# Patient Record
Sex: FEMALE | Race: BLACK OR AFRICAN AMERICAN | NOT HISPANIC OR LATINO | Employment: OTHER | URBAN - METROPOLITAN AREA
[De-identification: names, ages, dates, MRNs, and addresses within clinical notes are randomized per-mention and may not be internally consistent; named-entity substitution may affect disease eponyms.]

---

## 2023-11-17 ENCOUNTER — HOSPITAL ENCOUNTER (EMERGENCY)
Facility: HOSPITAL | Age: 88
Discharge: HOME | End: 2023-11-17
Attending: EMERGENCY MEDICINE
Payer: MEDICARE

## 2023-11-17 VITALS
OXYGEN SATURATION: 97 % | DIASTOLIC BLOOD PRESSURE: 85 MMHG | HEIGHT: 64 IN | TEMPERATURE: 98.2 F | BODY MASS INDEX: 30.73 KG/M2 | HEART RATE: 82 BPM | WEIGHT: 180 LBS | SYSTOLIC BLOOD PRESSURE: 152 MMHG | RESPIRATION RATE: 16 BRPM

## 2023-11-17 DIAGNOSIS — R53.1 GENERALIZED WEAKNESS: ICD-10-CM

## 2023-11-17 DIAGNOSIS — N30.00 ACUTE CYSTITIS WITHOUT HEMATURIA: Primary | ICD-10-CM

## 2023-11-17 LAB
ALBUMIN SERPL BCP-MCNC: 4.4 G/DL (ref 3.4–5)
ALP SERPL-CCNC: 69 U/L (ref 33–136)
ALT SERPL W P-5'-P-CCNC: 6 U/L (ref 7–45)
ANION GAP SERPL CALC-SCNC: 13 MMOL/L (ref 10–20)
APPEARANCE UR: ABNORMAL
AST SERPL W P-5'-P-CCNC: 17 U/L (ref 9–39)
BACTERIA #/AREA URNS AUTO: ABNORMAL /HPF
BASOPHILS # BLD AUTO: 0.04 X10*3/UL (ref 0–0.1)
BASOPHILS NFR BLD AUTO: 0.3 %
BILIRUB SERPL-MCNC: 0.5 MG/DL (ref 0–1.2)
BILIRUB UR STRIP.AUTO-MCNC: NEGATIVE MG/DL
BUN SERPL-MCNC: 12 MG/DL (ref 6–23)
CALCIUM SERPL-MCNC: 11.3 MG/DL (ref 8.6–10.6)
CARDIAC TROPONIN I PNL SERPL HS: 21 NG/L (ref 0–34)
CHLORIDE SERPL-SCNC: 100 MMOL/L (ref 98–107)
CO2 SERPL-SCNC: 30 MMOL/L (ref 21–32)
COLOR UR: YELLOW
CREAT SERPL-MCNC: 0.81 MG/DL (ref 0.5–1.05)
EOSINOPHIL # BLD AUTO: 0.06 X10*3/UL (ref 0–0.4)
EOSINOPHIL NFR BLD AUTO: 0.5 %
ERYTHROCYTE [DISTWIDTH] IN BLOOD BY AUTOMATED COUNT: 17.5 % (ref 11.5–14.5)
GFR SERPL CREATININE-BSD FRML MDRD: 69 ML/MIN/1.73M*2
GLUCOSE BLD MANUAL STRIP-MCNC: 94 MG/DL (ref 74–99)
GLUCOSE SERPL-MCNC: 87 MG/DL (ref 74–99)
GLUCOSE UR STRIP.AUTO-MCNC: NEGATIVE MG/DL
HCT VFR BLD AUTO: 42.7 % (ref 36–46)
HGB BLD-MCNC: 13 G/DL (ref 12–16)
HOLD SPECIMEN: NORMAL
HOLD SPECIMEN: NORMAL
IMM GRANULOCYTES # BLD AUTO: 0.06 X10*3/UL (ref 0–0.5)
IMM GRANULOCYTES NFR BLD AUTO: 0.5 % (ref 0–0.9)
KETONES UR STRIP.AUTO-MCNC: ABNORMAL MG/DL
LEUKOCYTE ESTERASE UR QL STRIP.AUTO: ABNORMAL
LYMPHOCYTES # BLD AUTO: 2.66 X10*3/UL (ref 0.8–3)
LYMPHOCYTES NFR BLD AUTO: 21.1 %
MAGNESIUM SERPL-MCNC: 1.95 MG/DL (ref 1.6–2.4)
MCH RBC QN AUTO: 21.8 PG (ref 26–34)
MCHC RBC AUTO-ENTMCNC: 30.4 G/DL (ref 32–36)
MCV RBC AUTO: 72 FL (ref 80–100)
MONOCYTES # BLD AUTO: 1 X10*3/UL (ref 0.05–0.8)
MONOCYTES NFR BLD AUTO: 7.9 %
NEUTROPHILS # BLD AUTO: 8.81 X10*3/UL (ref 1.6–5.5)
NEUTROPHILS NFR BLD AUTO: 69.7 %
NITRITE UR QL STRIP.AUTO: NEGATIVE
NRBC BLD-RTO: 0 /100 WBCS (ref 0–0)
PH UR STRIP.AUTO: 6 [PH]
PHOSPHATE SERPL-MCNC: 2.5 MG/DL (ref 2.5–4.9)
PLATELET # BLD AUTO: 279 X10*3/UL (ref 150–450)
POTASSIUM SERPL-SCNC: 3.8 MMOL/L (ref 3.5–5.3)
PROT SERPL-MCNC: 7.8 G/DL (ref 6.4–8.2)
PROT UR STRIP.AUTO-MCNC: ABNORMAL MG/DL
RBC # BLD AUTO: 5.96 X10*6/UL (ref 4–5.2)
RBC # UR STRIP.AUTO: ABNORMAL /UL
RBC #/AREA URNS AUTO: ABNORMAL /HPF
SARS-COV-2 RNA RESP QL NAA+PROBE: NOT DETECTED
SODIUM SERPL-SCNC: 139 MMOL/L (ref 136–145)
SP GR UR STRIP.AUTO: 1.01
SQUAMOUS #/AREA URNS AUTO: ABNORMAL /HPF
UROBILINOGEN UR STRIP.AUTO-MCNC: <2 MG/DL
WBC # BLD AUTO: 12.6 X10*3/UL (ref 4.4–11.3)
WBC #/AREA URNS AUTO: ABNORMAL /HPF

## 2023-11-17 PROCEDURE — 36415 COLL VENOUS BLD VENIPUNCTURE: CPT | Performed by: STUDENT IN AN ORGANIZED HEALTH CARE EDUCATION/TRAINING PROGRAM

## 2023-11-17 PROCEDURE — 99283 EMERGENCY DEPT VISIT LOW MDM: CPT

## 2023-11-17 PROCEDURE — 87635 SARS-COV-2 COVID-19 AMP PRB: CPT | Performed by: STUDENT IN AN ORGANIZED HEALTH CARE EDUCATION/TRAINING PROGRAM

## 2023-11-17 PROCEDURE — 99285 EMERGENCY DEPT VISIT HI MDM: CPT | Performed by: EMERGENCY MEDICINE

## 2023-11-17 PROCEDURE — 82947 ASSAY GLUCOSE BLOOD QUANT: CPT

## 2023-11-17 PROCEDURE — 85025 COMPLETE CBC W/AUTO DIFF WBC: CPT | Performed by: STUDENT IN AN ORGANIZED HEALTH CARE EDUCATION/TRAINING PROGRAM

## 2023-11-17 PROCEDURE — 83735 ASSAY OF MAGNESIUM: CPT | Performed by: STUDENT IN AN ORGANIZED HEALTH CARE EDUCATION/TRAINING PROGRAM

## 2023-11-17 PROCEDURE — 84100 ASSAY OF PHOSPHORUS: CPT | Performed by: STUDENT IN AN ORGANIZED HEALTH CARE EDUCATION/TRAINING PROGRAM

## 2023-11-17 PROCEDURE — 84484 ASSAY OF TROPONIN QUANT: CPT | Performed by: STUDENT IN AN ORGANIZED HEALTH CARE EDUCATION/TRAINING PROGRAM

## 2023-11-17 PROCEDURE — 80053 COMPREHEN METABOLIC PANEL: CPT | Performed by: STUDENT IN AN ORGANIZED HEALTH CARE EDUCATION/TRAINING PROGRAM

## 2023-11-17 PROCEDURE — 99284 EMERGENCY DEPT VISIT MOD MDM: CPT | Performed by: EMERGENCY MEDICINE

## 2023-11-17 PROCEDURE — 81001 URINALYSIS AUTO W/SCOPE: CPT | Performed by: STUDENT IN AN ORGANIZED HEALTH CARE EDUCATION/TRAINING PROGRAM

## 2023-11-17 PROCEDURE — 82947 ASSAY GLUCOSE BLOOD QUANT: CPT | Mod: 59

## 2023-11-17 PROCEDURE — 87086 URINE CULTURE/COLONY COUNT: CPT | Performed by: STUDENT IN AN ORGANIZED HEALTH CARE EDUCATION/TRAINING PROGRAM

## 2023-11-17 ASSESSMENT — PAIN - FUNCTIONAL ASSESSMENT: PAIN_FUNCTIONAL_ASSESSMENT: 0-10

## 2023-11-17 ASSESSMENT — COLUMBIA-SUICIDE SEVERITY RATING SCALE - C-SSRS
6. HAVE YOU EVER DONE ANYTHING, STARTED TO DO ANYTHING, OR PREPARED TO DO ANYTHING TO END YOUR LIFE?: NO
2. HAVE YOU ACTUALLY HAD ANY THOUGHTS OF KILLING YOURSELF?: NO
1. IN THE PAST MONTH, HAVE YOU WISHED YOU WERE DEAD OR WISHED YOU COULD GO TO SLEEP AND NOT WAKE UP?: NO

## 2023-11-17 ASSESSMENT — PAIN SCALES - GENERAL: PAINLEVEL_OUTOF10: 0 - NO PAIN

## 2023-11-17 NOTE — ED PROVIDER NOTES
CC: Fatigue (Pt brought in from home by EMS for weakness x 3 days. Pt's home health nurse started pt on doxycycline yesterday  for suspected UTI.)     HPI:  Patient is a 89-year-old female with history of DM, CAD status post CABG, HLD, OA, urolithiasis, and dementia, presenting to the ED due to fatigue and generalized weakness.  Per EMS report, daughter sent her in as she has had 3 days of increasing generalized weakness and seems to be more tired.  States that she was placed on doxycycline yesterday for a UTI.  Denies any recent illnesses including fever or chills.  No falls.      Limitations to History: Dementia    Additional History Obtained from: EMS    Records Reviewed:  Recent available ED and inpatient notes reviewed in EMR.    PMHx/PSHx:  Per HPI.   - has a past medical history of Dementia (CMS/[facility]).  - has no past surgical history on file.    Medications:  Reviewed in EMR. See EMR for complete list of medications and doses.    Allergies:  Patient has no allergy information on record.    Social History:  - Tobacco:  reports that she has never smoked. She has never used smokeless tobacco.   - Alcohol:  has no history on file for alcohol use.   - Illicit Drugs:  has no history on file for drug use.     ROS:  Per HPI.     ???????????????????????????????????????????????????????????????  Triage Vitals:  T 36.8 °C (98.2 °F)  HR 75  BP (!) 180/108  RR 22  O2 96 % None (Room air)    PHYSICAL EXAM:   VS: As documented in the triage note and EMR flowsheet from this visit were reviewed.  Gen: Well developed. Well nourished.  Appears comfortable.  Eyes: PERRL, EOMI. Clear scerla.  HENT: NC/AT, Mucosal membranes moist.   Neck: Supple, no cervical LAD  Resp: Non-labored breathing on RA, CTAB, no wheezes or crackles  CV: RRR, nl S1, S2, no murmurs  Abd: Soft, non-distended, non-tender, no rebound or guarding  Ext: no LE edema, pulses full and equal  Skin: WWP. No systemic rashes or lesions.  MSK: normal muscle bulk, no  obvious joint swelling in extremities  Neuro:  alert and oriented to self but not place or situation, speech fluent, MAEx4 with equal strength, sensation intact x4 extremities, no facial droop, strong/equal shoulder shrug and neck turning against resistance, no focal deficit  Psych: Maintains eye contact, Appropriate mood and affect  ???????????????????????????????????????????????????????????????    ED Labs/Imaging:   Labs Reviewed   URINE CULTURE - Abnormal       Result Value    Urine Culture   (*)     Value: Multiple organisms present, probable contamination. Repeat culture if clinically indicated.   CBC WITH AUTO DIFFERENTIAL - Abnormal    WBC 12.6 (*)     nRBC 0.0      RBC 5.96 (*)     Hemoglobin 13.0      Hematocrit 42.7      MCV 72 (*)     MCH 21.8 (*)     MCHC 30.4 (*)     RDW 17.5 (*)     Platelets 279      Neutrophils % 69.7      Immature Granulocytes %, Automated 0.5      Lymphocytes % 21.1      Monocytes % 7.9      Eosinophils % 0.5      Basophils % 0.3      Neutrophils Absolute 8.81 (*)     Immature Granulocytes Absolute, Automated 0.06      Lymphocytes Absolute 2.66      Monocytes Absolute 1.00 (*)     Eosinophils Absolute 0.06      Basophils Absolute 0.04     COMPREHENSIVE METABOLIC PANEL - Abnormal    Glucose 87      Sodium 139      Potassium 3.8      Chloride 100      Bicarbonate 30      Anion Gap 13      Urea Nitrogen 12      Creatinine 0.81      eGFR 69      Calcium 11.3 (*)     Albumin 4.4      Alkaline Phosphatase 69      Total Protein 7.8      AST 17      Bilirubin, Total 0.5      ALT 6 (*)    URINALYSIS WITH REFLEX MICROSCOPIC AND CULTURE - Abnormal    Color, Urine Yellow      Appearance, Urine Hazy (*)     Specific Gravity, Urine 1.013      pH, Urine 6.0      Protein, Urine 100 (2+) (*)     Glucose, Urine NEGATIVE      Blood, Urine SMALL (1+) (*)     Ketones, Urine 5 (TRACE) (*)     Bilirubin, Urine NEGATIVE      Urobilinogen, Urine <2.0      Nitrite, Urine NEGATIVE      Leukocyte Esterase,  Urine SMALL (1+) (*)    MICROSCOPIC ONLY, URINE - Abnormal    WBC, Urine 11-20 (*)     RBC, Urine 3-5      Squamous Epithelial Cells, Urine 1-9 (SPARSE)      Bacteria, Urine 1+ (*)    MAGNESIUM - Normal    Magnesium 1.95     PHOSPHORUS - Normal    Phosphorus 2.5     TROPONIN I, HIGH SENSITIVITY - Normal    Troponin I, High Sensitivity 21      Narrative:     Less than 99th percentile of normal range cutoff-  Female and children under 18 years old <35 ng/L; Male <54 ng/L: Negative  Repeat testing should be performed if clinically indicated.     Female and children under 18 years old  ng/L; Male  ng/L:  Consistent with possible cardiac damage and possible increased clinical   risk. Serial measurements may help to assess extent of myocardial damage.     >120 ng/L: Consistent with cardiac damage, increased clinical risk and  myocardial infarction. Serial measurements may help assess extent of   myocardial damage.      NOTE: Children less than 1 year old may have higher baseline troponin   levels and results should be interpreted in conjunction with the overall   clinical context.    NOTE: Troponin I testing is performed using a different   testing methodology at [facility] than at other   [facility]. Direct result comparisons should only   be made within the same method.     SARS-COV-2 PCR, SYMPTOMATIC - Normal    Coronavirus 2019, PCR Not Detected      Narrative:     This assay has received FDA Emergency Use Authorization (EUA) and is only authorized for the duration of time that circumstances exist to justify the authorization of the emergency use of in vitro diagnostic tests for the detection of SARS-CoV-2 virus and/or diagnosis of COVID-19 infection under section 564(b)(1) of the Act, 21 U.S.C. 360bbb-3(b)(1). This assay is an in vitro diagnostic nucleic acid amplification test for the qualitative detection of SARS-CoV-2 from nasopharyngeal specimens and has been validated for use at  [facility]. Negative results do not preclude COVID-19 infections and should not be used as the sole basis for diagnosis, treatment, or other management decisions.     POCT GLUCOSE - Normal    POCT Glucose 94     URINALYSIS WITH REFLEX MICROSCOPIC AND CULTURE    Narrative:     The following orders were created for panel order Urinalysis with Reflex Microscopic and Culture.  Procedure                               Abnormality         Status                     ---------                               -----------         ------                     Urinalysis with Reflex M...[[id number]]  Abnormal            Final result               Extra Urine Gray Tube[[id number]]                            Final result                 Please view results for these tests on the individual orders.   EXTRA URINE GRAY TUBE    Extra Tube Hold for add-ons.     POCT GLUCOSE METER     No orders to display         ED Course & MDM   Diagnoses as of 11/19/23 0951   Acute cystitis without hematuria   Generalized weakness           Medical Decision Making  This is a 89-year-old female with above-stated history including advanced dementia presenting to the ED for generalized weakness in the setting of recently diagnosed UTI.  Patient arrives hemodynamically stable and not in acute distress.  Afebrile.  Asymptomatic.  No signs of focal weakness to suggest stroke.  No signs of focal infection including clear lungs, soft and nontender abdomen, no skin breakdown.  Basic labs were obtained which showed no significant electrolyte derangement, no ROZ, and no leukocytosis or anemia.  Patient was found to have a UTI which was known and patient is currently on antibiotics.  Which she actually started today.  Had a discussion with patient and family at bedside and offered observation admission and PT OT however patient family feels comfortable taking her home which aligns with patient's goals of care.  Return precautions were discussed  and primary care follow-up was suggested.  Return precautions included but not limited to fever, worsening weakness or confusion.  Family agrees and patient was discharged home in stable condition.      Social Determinants Limiting Care:  None identified    Disposition:  Discharge home.    Patient seen and discussed with attending physician.    Klarissa Rachel MD PGY3  Emergency Medicine      Procedures ? SmartLinks last updated 11/19/2023 9:51 AM        Klarissa Rachel MD  Resident  11/19/23 0953

## 2023-11-19 LAB — BACTERIA UR CULT: ABNORMAL

## 2023-11-20 ENCOUNTER — TELEPHONE (OUTPATIENT)
Dept: PHARMACY | Facility: HOSPITAL | Age: 88
End: 2023-11-20
Payer: MEDICARE

## 2023-11-20 NOTE — PROGRESS NOTES
EDPD Note: Rapid Result Review    Reviewed Ms. Marta Holt 's chart regarding a inconclusive result that was taken during their recent emergency room visit. The patient was told about these results prior to leaving the emergency department. Therefore, patient was contacted and given proper education. Per ED note patient was started on doxycyline for suspected UTI by home health nurse. Provided education on inconclusive result and if she still has symptoms that a new culture would be needed. Patient denied having any symptoms. Of note it does state in her chart that she has a history of dementia. Attempted to call patient's daughter Liz ([phone]) as well to let her know the results however she did not .     Urine Culture Multiple organisms present, probable contamination. Repeat culture if clinically indicated. Abnormal           No further follow up needed from EDPD Team.     Amaya Acuna, PharmD

## 2024-05-11 ENCOUNTER — APPOINTMENT (OUTPATIENT)
Dept: RADIOLOGY | Facility: HOSPITAL | Age: 89
End: 2024-05-11
Payer: MEDICARE

## 2024-05-11 ENCOUNTER — CLINICAL SUPPORT (OUTPATIENT)
Dept: EMERGENCY MEDICINE | Facility: HOSPITAL | Age: 89
End: 2024-05-11
Payer: MEDICARE

## 2024-05-11 ENCOUNTER — HOSPITAL ENCOUNTER (EMERGENCY)
Facility: HOSPITAL | Age: 89
Discharge: HOME | End: 2024-05-11
Attending: EMERGENCY MEDICINE
Payer: MEDICARE

## 2024-05-11 VITALS
HEART RATE: 74 BPM | OXYGEN SATURATION: 98 % | SYSTOLIC BLOOD PRESSURE: 115 MMHG | TEMPERATURE: 97.9 F | DIASTOLIC BLOOD PRESSURE: 60 MMHG | RESPIRATION RATE: 14 BRPM

## 2024-05-11 DIAGNOSIS — R40.4 TRANSIENT ALTERATION OF AWARENESS: Primary | ICD-10-CM

## 2024-05-11 LAB
ALBUMIN SERPL BCP-MCNC: 3.6 G/DL (ref 3.4–5)
ALP SERPL-CCNC: 67 U/L (ref 33–136)
ALT SERPL W P-5'-P-CCNC: 14 U/L (ref 7–45)
ANION GAP BLDV CALCULATED.4IONS-SCNC: 6 MMOL/L (ref 10–25)
ANION GAP SERPL CALC-SCNC: 19 MMOL/L (ref 10–20)
APPEARANCE UR: ABNORMAL
APTT PPP: 23 SECONDS (ref 27–38)
AST SERPL W P-5'-P-CCNC: 24 U/L (ref 9–39)
BACTERIA #/AREA URNS AUTO: ABNORMAL /HPF
BASE EXCESS BLDV CALC-SCNC: 4 MMOL/L (ref -2–3)
BASOPHILS # BLD AUTO: 0.05 X10*3/UL (ref 0–0.1)
BASOPHILS NFR BLD AUTO: 0.4 %
BILIRUB SERPL-MCNC: 0.5 MG/DL (ref 0–1.2)
BILIRUB UR STRIP.AUTO-MCNC: NEGATIVE MG/DL
BODY TEMPERATURE: 37 DEGREES CELSIUS
BUN SERPL-MCNC: 18 MG/DL (ref 6–23)
CA-I BLDV-SCNC: 1.39 MMOL/L (ref 1.1–1.33)
CALCIUM SERPL-MCNC: 10.3 MG/DL (ref 8.6–10.6)
CARDIAC TROPONIN I PNL SERPL HS: 67 NG/L (ref 0–34)
CARDIAC TROPONIN I PNL SERPL HS: 70 NG/L (ref 0–34)
CHLORIDE BLDV-SCNC: 101 MMOL/L (ref 98–107)
CHLORIDE SERPL-SCNC: 97 MMOL/L (ref 98–107)
CO2 SERPL-SCNC: 26 MMOL/L (ref 21–32)
COLOR UR: YELLOW
CREAT SERPL-MCNC: 1.42 MG/DL (ref 0.5–1.05)
EGFRCR SERPLBLD CKD-EPI 2021: 35 ML/MIN/1.73M*2
EOSINOPHIL # BLD AUTO: 0.14 X10*3/UL (ref 0–0.4)
EOSINOPHIL NFR BLD AUTO: 1.2 %
ERYTHROCYTE [DISTWIDTH] IN BLOOD BY AUTOMATED COUNT: 17.5 % (ref 11.5–14.5)
GLUCOSE BLDV-MCNC: 224 MG/DL (ref 74–99)
GLUCOSE SERPL-MCNC: 205 MG/DL (ref 74–99)
GLUCOSE UR STRIP.AUTO-MCNC: NORMAL MG/DL
HCO3 BLDV-SCNC: 30.5 MMOL/L (ref 22–26)
HCT VFR BLD AUTO: 34.7 % (ref 36–46)
HCT VFR BLD EST: 34 % (ref 36–46)
HGB BLD-MCNC: 11.1 G/DL (ref 12–16)
HGB BLDV-MCNC: 11.4 G/DL (ref 12–16)
HYALINE CASTS #/AREA URNS AUTO: ABNORMAL /LPF
IMM GRANULOCYTES # BLD AUTO: 0.09 X10*3/UL (ref 0–0.5)
IMM GRANULOCYTES NFR BLD AUTO: 0.8 % (ref 0–0.9)
INR PPP: 1 (ref 0.9–1.1)
KETONES UR STRIP.AUTO-MCNC: NEGATIVE MG/DL
LACTATE BLDV-SCNC: 3.8 MMOL/L (ref 0.4–2)
LEUKOCYTE ESTERASE UR QL STRIP.AUTO: NEGATIVE
LYMPHOCYTES # BLD AUTO: 4.3 X10*3/UL (ref 0.8–3)
LYMPHOCYTES NFR BLD AUTO: 35.8 %
MAGNESIUM SERPL-MCNC: 1.85 MG/DL (ref 1.6–2.4)
MCH RBC QN AUTO: 21.8 PG (ref 26–34)
MCHC RBC AUTO-ENTMCNC: 32 G/DL (ref 32–36)
MCV RBC AUTO: 68 FL (ref 80–100)
MONOCYTES # BLD AUTO: 0.96 X10*3/UL (ref 0.05–0.8)
MONOCYTES NFR BLD AUTO: 8 %
MUCOUS THREADS #/AREA URNS AUTO: ABNORMAL /LPF
NEUTROPHILS # BLD AUTO: 6.46 X10*3/UL (ref 1.6–5.5)
NEUTROPHILS NFR BLD AUTO: 53.8 %
NITRITE UR QL STRIP.AUTO: NEGATIVE
NRBC BLD-RTO: 0 /100 WBCS (ref 0–0)
OXYHGB MFR BLDV: 21.3 % (ref 45–75)
PCO2 BLDV: 54 MM HG (ref 41–51)
PH BLDV: 7.36 PH (ref 7.33–7.43)
PH UR STRIP.AUTO: 5.5 [PH]
PLATELET # BLD AUTO: 309 X10*3/UL (ref 150–450)
PO2 BLDV: 23 MM HG (ref 35–45)
POTASSIUM BLDV-SCNC: 3.5 MMOL/L (ref 3.5–5.3)
POTASSIUM SERPL-SCNC: 3.9 MMOL/L (ref 3.5–5.3)
PROT SERPL-MCNC: 6.6 G/DL (ref 6.4–8.2)
PROT UR STRIP.AUTO-MCNC: ABNORMAL MG/DL
PROTHROMBIN TIME: 11.3 SECONDS (ref 9.8–12.8)
RBC # BLD AUTO: 5.1 X10*6/UL (ref 4–5.2)
RBC # UR STRIP.AUTO: NEGATIVE /UL
RBC #/AREA URNS AUTO: ABNORMAL /HPF
SAO2 % BLDV: 22 % (ref 45–75)
SODIUM BLDV-SCNC: 134 MMOL/L (ref 136–145)
SODIUM SERPL-SCNC: 138 MMOL/L (ref 136–145)
SP GR UR STRIP.AUTO: 1.02
SQUAMOUS #/AREA URNS AUTO: ABNORMAL /HPF
UROBILINOGEN UR STRIP.AUTO-MCNC: NORMAL MG/DL
WBC # BLD AUTO: 12 X10*3/UL (ref 4.4–11.3)
WBC #/AREA URNS AUTO: ABNORMAL /HPF

## 2024-05-11 PROCEDURE — 96361 HYDRATE IV INFUSION ADD-ON: CPT

## 2024-05-11 PROCEDURE — 71045 X-RAY EXAM CHEST 1 VIEW: CPT | Performed by: RADIOLOGY

## 2024-05-11 PROCEDURE — 99285 EMERGENCY DEPT VISIT HI MDM: CPT | Mod: 25

## 2024-05-11 PROCEDURE — 81001 URINALYSIS AUTO W/SCOPE: CPT

## 2024-05-11 PROCEDURE — 83735 ASSAY OF MAGNESIUM: CPT

## 2024-05-11 PROCEDURE — 84132 ASSAY OF SERUM POTASSIUM: CPT | Mod: 91

## 2024-05-11 PROCEDURE — 70450 CT HEAD/BRAIN W/O DYE: CPT | Performed by: RADIOLOGY

## 2024-05-11 PROCEDURE — 93005 ELECTROCARDIOGRAM TRACING: CPT

## 2024-05-11 PROCEDURE — 70450 CT HEAD/BRAIN W/O DYE: CPT

## 2024-05-11 PROCEDURE — 71045 X-RAY EXAM CHEST 1 VIEW: CPT

## 2024-05-11 PROCEDURE — 36415 COLL VENOUS BLD VENIPUNCTURE: CPT

## 2024-05-11 PROCEDURE — 2500000004 HC RX 250 GENERAL PHARMACY W/ HCPCS (ALT 636 FOR OP/ED)

## 2024-05-11 PROCEDURE — 99285 EMERGENCY DEPT VISIT HI MDM: CPT | Performed by: EMERGENCY MEDICINE

## 2024-05-11 PROCEDURE — 84484 ASSAY OF TROPONIN QUANT: CPT | Mod: 91

## 2024-05-11 PROCEDURE — 84484 ASSAY OF TROPONIN QUANT: CPT

## 2024-05-11 PROCEDURE — 96360 HYDRATION IV INFUSION INIT: CPT

## 2024-05-11 PROCEDURE — 82947 ASSAY GLUCOSE BLOOD QUANT: CPT

## 2024-05-11 PROCEDURE — 85610 PROTHROMBIN TIME: CPT

## 2024-05-11 PROCEDURE — 85730 THROMBOPLASTIN TIME PARTIAL: CPT

## 2024-05-11 PROCEDURE — 85025 COMPLETE CBC W/AUTO DIFF WBC: CPT

## 2024-05-11 RX ADMIN — SODIUM CHLORIDE, POTASSIUM CHLORIDE, SODIUM LACTATE AND CALCIUM CHLORIDE 500 ML: 600; 310; 30; 20 INJECTION, SOLUTION INTRAVENOUS at 04:13

## 2024-05-11 RX ADMIN — SODIUM CHLORIDE, POTASSIUM CHLORIDE, SODIUM LACTATE AND CALCIUM CHLORIDE 500 ML: 600; 310; 30; 20 INJECTION, SOLUTION INTRAVENOUS at 02:37

## 2024-05-11 ASSESSMENT — PAIN SCALES - GENERAL: PAINLEVEL_OUTOF10: 0 - NO PAIN

## 2024-05-11 ASSESSMENT — LIFESTYLE VARIABLES
HAVE YOU EVER FELT YOU SHOULD CUT DOWN ON YOUR DRINKING: NO
HAVE PEOPLE ANNOYED YOU BY CRITICIZING YOUR DRINKING: NO
EVER HAD A DRINK FIRST THING IN THE MORNING TO STEADY YOUR NERVES TO GET RID OF A HANGOVER: NO
TOTAL SCORE: 0
EVER FELT BAD OR GUILTY ABOUT YOUR DRINKING: NO

## 2024-05-11 ASSESSMENT — COLUMBIA-SUICIDE SEVERITY RATING SCALE - C-SSRS
1. IN THE PAST MONTH, HAVE YOU WISHED YOU WERE DEAD OR WISHED YOU COULD GO TO SLEEP AND NOT WAKE UP?: NO
2. HAVE YOU ACTUALLY HAD ANY THOUGHTS OF KILLING YOURSELF?: NO
6. HAVE YOU EVER DONE ANYTHING, STARTED TO DO ANYTHING, OR PREPARED TO DO ANYTHING TO END YOUR LIFE?: NO

## 2024-05-11 ASSESSMENT — PAIN - FUNCTIONAL ASSESSMENT: PAIN_FUNCTIONAL_ASSESSMENT: 0-10

## 2024-05-11 NOTE — ED TRIAGE NOTES
Per EMS pt was sitting in chair when she suddenly went unresponsive, awoke confused, non-verbal and not following commands, per EMS event took place about 30 minutes pta, BAT was called pt taken to CT on arrival

## 2024-05-11 NOTE — ED PROVIDER NOTES
HPI:  89-year-old female history of DM, CAD s/p CABG, HLD, OA, urolithiasis and dementia presenting to the ED as a BAT.  Per EMS report, patient coming from home where family there witnessed her have an event where she became unresponsive for a few seconds and awoke confused with difficulty formulating words not following commands.  LNK approximately 30 minutes prior to arrival, not following commands per EMS however appears to be moving all extremities equally, no obvious facial droop.  Per reports patient baseline is conversive and responsive to questioning with some baseline confusion. At baseline patient is wheelchair-bound, nonambulatory and requires assistance with ADLs, has 24/7 supervision at home.  Additional history provided by patient's granddaughter, stating that patient was in usual state of health earlier today however they then went downstairs and found her lethargic, not responding to voice or shaking and was slower to respond than usual.  Family does note that patient had some emesis 2 days prior but was given oral rehydration and appeared to be improving prior to this event today.  No histories of seizures, no witnessed tonic-clonic activity.  Patient without any acute complaints.  Neurostroke bedside, initial NIH of 12.  Taken to CT for imaging.  Not a TNK candidate.    ------------------------------------------------------------------------------------------------------------------------------------------  Physical Exam:    VS: As documented in the triage note and EMR flowsheet from this visit were reviewed.  General: elderly, frail appearing. No acute distress.   Eyes: Pupils round and reactive. No scleral icterus. EOM grossly intact  HENT: Atraumatic. Normocephalic. Moist mucous membranes.   CV: Regular rate. No pedal edema appreciated.  Resp: Clear to auscultation bilaterally. Non-labored.    GI: Soft, nontender to palpation. Nondistended.   Skin: Warm, dry, intact. No systemic rashes or  lesions appreciated.  Neuro: Alert and responsive. Intermittently follows commands. No facial droop, normal gaze, no grossly obvious vision loss. B/l UE strength 4/5, b/l LE strength 3/5. No gross sensory deficits. No aphasia, mild dysarthria. Oriented to self and place.     NIH 12    ------------------------------------------------------------------------------------------------------------------------------------------    Medical Decision Making:  ED Course as of 05/11/24 0749   Sat May 11, 2024   0211 CT brain attack head wo IV contrast  CT head showing no acute intracranial abnormality, chronic small vessel ischemic disease with a chronic infarct in the left paul [KR]   0211 ECG 12 lead  Normal sinus rhythm rate 71, normal axis.  Interpretation limited by poor baseline but no acute ST segment elevation or depression.  , QRS 84, QTc 425.  No previous ECG for comparison. [KR]   0212 POCT Lactate, Venous(!): 3.8 [KR]      ED Course User Index  [KR] Rosalie Aponte DO         Diagnoses as of 05/11/24 0749   Transient alteration of awareness   89-year-old female history as above presenting as a bat after witnessed episode at home of unresponsiveness and altered mental status.  Initial vitals stable, patient maintaining airway and in no acute distress, able to state name and location.  Initial NIH of 12, baseline patient is wheelchair-bound, nonambulatory and requires assistance with most ADLs.  Motor and sensory wise she does appear to be at her baseline, no facial droop or focal deficits appreciated.  CT head obtained showing no acute intracranial abnormality, bleed, some evidence of a chronic infarct.  Patient not a TNK or intervention candidate with overall low suspicion for acute stroke, neurostroke signed off.  After additional history appears that patient may have had an episode of unresponsiveness versus lethargy for which a metabolic and infectious workup was initiated.  Patient clinically has advanced  dementia, appears to be at her baseline mental status per family at bedside.  ECG obtained nonischemic, patient did have a mild troponin elevation with a delta that was stable, suspect this is secondary to demand ischemia as patient had recent GI losses with nausea vomiting, overall low concern for acute ACS.  Additionally she did have an ROZ likely in setting of recent GI loss.  Treated with 1 L IV fluid bolus with increase in urine output.  Stable leukocytosis unchanged from prior labs, mild anemia without evidence of acute blood loss.  Chest x-ray was obtained which did not show any focal consolidation or pleural effusion, no tachypnea or hypoxia to suggest pneumonia.  A urine was obtained which did not show concern for urinary tract infection.  No significant electrolyte derangements or evidence of significant metabolic abnormality.  Patient did appear to be clinically at her baseline with family supporting this evaluation.  Patient currently on home hospice, DNR/DNI.  Given otherwise benign workup we did discuss with family and shared decision making performed as patient was discharged home back to family with 24-hour home health care.  Recommended to return should patient have any worsening symptoms or family have further concerns.    Differential Diagnoses Considered:  See MDM     Chronic Medical Conditions Significantly Affecting Care:  See MDM     External Records Reviewed:  I reviewed recent and relevant outside records as noted in HPI above and MDM.      Social Determinants of Health Significantly Affecting Care:  See HPI/MDM     Prescription Drug Consideration:  See MDM     Diagnostic testing considered:  See MDM and relevant sections      Rosalie Aponte DO  EM PGY-2     Rosalie Aponte DO  Resident  05/11/24 0755       Rosalie Aponte DO  Resident  05/11/24 6126

## 2024-05-11 NOTE — SIGNIFICANT EVENT
CANCELLED BRAIN ATTACK/STROKE ALERT DOCUMENTATION:        Marta Holt is a 89 y.o. with a history of HTN, HLD, DM Type II, and advanced Alzheimer's dementia (MRS 4) who presented as a BAT for unresponsiveness and altered mental status. At baseline she relies on a wheelchair and requires assistance for most ADLs and needs 24/7 supervision. Per family, she was in her usual state of health earlier this evening, but started becoming less responsive and talking less, more confused than usual. She presented last in  with similar symptoms and had a UTI.     Last Known Well Time: 50  Resident arrival time: 120  Time BAT was called: 119  CT/CTA reviewed time: As being performed  TNK exclusion: Working diagnosis is not an acute stroke       ------------------------------------------------------------------------------------------------------------------------------------------     Physical Exam:    Vitals:    24 0145   BP: 100/66   Pulse: 76   Resp: 16   Temp: 36.6 °C (97.9 °F)   SpO2: 97%       Neurological Exam  Physical Exam     Interval: Baseline  Time: 2:22 AM  Person Administering Scale: Torsten Hawkins MD     1a  Level of consciousness: 0=alert; keenly responsive   1b. LOC questions:  2=Performs neither task correctly   1c. LOC commands: 0=Performs both tasks correctly   2.  Best Gaze: 0=normal   3. Visual: 0=No visual loss   4. Facial Palsy: 0=Normal symmetric movement   5a. Motor left arm: 2=Some effort against gravity, limb cannot get to or maintain (if cured) 90 (or 45) degrees, drifts down to bed, but has some effort against gravity   5b.  Motor right arm: 2=Some effort against gravity, limb cannot get to or maintain (if cured) 90 (or 45) degrees, drifts down to bed, but has some effort against gravity   6a. motor left le=Some effort against gravity, limb cannot get to or maintain (if cured) 90 (or 45) degrees, drifts down to bed, but has some effort against gravity   6b  Motor right leg:   2=Some effort against gravity, limb cannot get to or maintain (if cured) 90 (or 45) degrees, drifts down to bed, but has some effort against gravity   7. Limb Ataxia: 0=Absent   8.  Sensory: 0=Normal; no sensory loss   9. Best Language:  0=No aphasia, normal   10. Dysarthria: 1=Mild to moderate, patient slurs at least some words and at worst, can be understood with some difficulty   11. Extinction and Inattention: 0=No abnormality          Total:   12        ------------------------------------------------------------------------------------------------------------------------------------------     Medical Decision Making:   Not a stroke         IV Thrombolysis IV Thrombolysis Checklist      IV Thrombolysis Given: No; Thrombolysis contraindication reason: Working diagnosis is NOT a suspected ischemic stroke      Assessment   CTH was reviewed as being performed and on personal review shows moderate-severe diffuse atrophy, chronic WM disease, and a chronic-appearing L pontine hypodensity concerning for remote lacunar infarct. Her exam was otherwise non-focal, albeit with limitations due to her advanced dementia.     - Pt not an candidate for TNK due to low suspicion for acute stroke.     Recommendations     - Discussed with ED primary team who agree with cancellation of BAT  - [place] [phone] with any further questions   - Would recommend continued toxic/metabolic/infectious work-up

## 2024-05-12 LAB
ATRIAL RATE: 75 BPM
GLUCOSE BLD MANUAL STRIP-MCNC: 184 MG/DL (ref 74–99)
P OFFSET: 186 MS
P ONSET: 128 MS
PR INTERVAL: 192 MS
Q ONSET: 224 MS
QRS COUNT: 13 BEATS
QRS DURATION: 74 MS
QT INTERVAL: 398 MS
QTC CALCULATION(BAZETT): 444 MS
QTC FREDERICIA: 428 MS
R AXIS: 2 DEGREES
T AXIS: 146 DEGREES
T OFFSET: 423 MS
VENTRICULAR RATE: 75 BPM